# Patient Record
Sex: MALE | Race: WHITE | URBAN - METROPOLITAN AREA
[De-identification: names, ages, dates, MRNs, and addresses within clinical notes are randomized per-mention and may not be internally consistent; named-entity substitution may affect disease eponyms.]

---

## 2017-07-19 ENCOUNTER — IMPORTED ENCOUNTER (OUTPATIENT)
Dept: URBAN - METROPOLITAN AREA CLINIC 50 | Facility: CLINIC | Age: 82
End: 2017-07-19

## 2017-07-25 ENCOUNTER — IMPORTED ENCOUNTER (OUTPATIENT)
Dept: URBAN - METROPOLITAN AREA CLINIC 50 | Facility: CLINIC | Age: 82
End: 2017-07-25

## 2018-07-25 ENCOUNTER — IMPORTED ENCOUNTER (OUTPATIENT)
Dept: URBAN - METROPOLITAN AREA CLINIC 50 | Facility: CLINIC | Age: 83
End: 2018-07-25

## 2019-08-01 ENCOUNTER — IMPORTED ENCOUNTER (OUTPATIENT)
Dept: URBAN - METROPOLITAN AREA CLINIC 50 | Facility: CLINIC | Age: 84
End: 2019-08-01

## 2020-02-04 ENCOUNTER — IMPORTED ENCOUNTER (OUTPATIENT)
Dept: URBAN - METROPOLITAN AREA CLINIC 50 | Facility: CLINIC | Age: 85
End: 2020-02-04

## 2021-02-16 ENCOUNTER — IMPORTED ENCOUNTER (OUTPATIENT)
Dept: URBAN - METROPOLITAN AREA CLINIC 50 | Facility: CLINIC | Age: 86
End: 2021-02-16

## 2021-06-14 ASSESSMENT — VISUAL ACUITY
OD_CC: J1
OD_OTHER: 20/40. 20/40.
OD_CC: J1
OS_CC: J1
OS_PH: 20/25-2
OD_OTHER: 20/80. 20/80.
OD_BAT: 20/60+2
OD_OTHER: 20/60. 20/80.
OS_CC: J1
OD_CC: 20/25-2
OS_CC: J1-
OS_CC: 20/30+2
OD_CC: J1-
OS_CC: 20/30
OD_BAT: 20/80
OD_CC: 20/30-
OD_PH: 20/40
OS_CC: 20/30-2
OD_CC: 20/50+1
OD_CC: 20/30+
OD_BAT: 20/60
OS_CC: 20/30
OD_CC: 20/40
OD_BAT: 20/40
OS_CC: 20/30-2

## 2021-06-14 ASSESSMENT — TONOMETRY
OD_IOP_MMHG: 15
OS_IOP_MMHG: 15
OS_IOP_MMHG: 16
OD_IOP_MMHG: 14
OD_IOP_MMHG: 16
OD_IOP_MMHG: 15
OD_IOP_MMHG: 16
OD_IOP_MMHG: 15
OS_IOP_MMHG: 16
OD_IOP_MMHG: 14
OD_IOP_MMHG: 17
OD_IOP_MMHG: 16
OS_IOP_MMHG: 18
OD_IOP_MMHG: 15
OS_IOP_MMHG: 15

## 2021-06-14 ASSESSMENT — PACHYMETRY
OD_CT_UM: 567
OD_CT_UM: 567
OS_CT_UM: 552
OD_CT_UM: 567
OS_CT_UM: 552
OD_CT_UM: 567

## 2022-05-26 ENCOUNTER — PREPPED CHART (OUTPATIENT)
Dept: URBAN - METROPOLITAN AREA CLINIC 49 | Facility: CLINIC | Age: 87
End: 2022-05-26

## 2022-06-02 ENCOUNTER — COMPREHENSIVE EXAM (OUTPATIENT)
Dept: URBAN - METROPOLITAN AREA CLINIC 49 | Facility: CLINIC | Age: 87
End: 2022-06-02

## 2022-06-02 DIAGNOSIS — H16.223: ICD-10-CM

## 2022-06-02 DIAGNOSIS — H52.4: ICD-10-CM

## 2022-06-02 DIAGNOSIS — H43.813: ICD-10-CM

## 2022-06-02 DIAGNOSIS — Z01.01: ICD-10-CM

## 2022-06-02 DIAGNOSIS — H35.3131: ICD-10-CM

## 2022-06-02 DIAGNOSIS — H35.371: ICD-10-CM

## 2022-06-02 DIAGNOSIS — H35.033: ICD-10-CM

## 2022-06-02 PROCEDURE — 92015 DETERMINE REFRACTIVE STATE: CPT

## 2022-06-02 PROCEDURE — 92134 CPTRZ OPH DX IMG PST SGM RTA: CPT

## 2022-06-02 PROCEDURE — 92014 COMPRE OPH EXAM EST PT 1/>: CPT

## 2022-06-02 ASSESSMENT — VISUAL ACUITY
OD_GLARE: 20/80
OD_GLARE: 20/70
OU_CC: J1
OD_CC: 20/40-2
OS_CC: 20/40

## 2022-06-02 ASSESSMENT — TONOMETRY
OS_IOP_MMHG: 18
OD_IOP_MMHG: 16

## 2022-06-02 NOTE — PATIENT DISCUSSION
Recommend PF artificial tears 4 times daily in both eyes for best vision and comfort. Brands such as Refresh, Thera Tears, and Systane are good options.

## 2022-07-09 ENCOUNTER — TELEPHONE ENCOUNTER (OUTPATIENT)
Dept: URBAN - METROPOLITAN AREA CLINIC 121 | Facility: CLINIC | Age: 87
End: 2022-07-09

## 2022-07-10 ENCOUNTER — TELEPHONE ENCOUNTER (OUTPATIENT)
Dept: URBAN - METROPOLITAN AREA CLINIC 121 | Facility: CLINIC | Age: 87
End: 2022-07-10